# Patient Record
Sex: FEMALE | Race: WHITE | NOT HISPANIC OR LATINO | URBAN - METROPOLITAN AREA
[De-identification: names, ages, dates, MRNs, and addresses within clinical notes are randomized per-mention and may not be internally consistent; named-entity substitution may affect disease eponyms.]

---

## 2023-03-16 ENCOUNTER — OFFICE (OUTPATIENT)
Dept: URBAN - METROPOLITAN AREA CLINIC 67 | Facility: CLINIC | Age: 58
End: 2023-03-16

## 2023-03-16 VITALS
HEIGHT: 61 IN | SYSTOLIC BLOOD PRESSURE: 110 MMHG | HEART RATE: 92 BPM | WEIGHT: 148 LBS | DIASTOLIC BLOOD PRESSURE: 80 MMHG | OXYGEN SATURATION: 93 %

## 2023-03-16 DIAGNOSIS — K21.9 GASTRO-ESOPHAGEAL REFLUX DISEASE WITHOUT ESOPHAGITIS: ICD-10-CM

## 2023-03-16 DIAGNOSIS — R10.13 EPIGASTRIC PAIN: ICD-10-CM

## 2023-03-16 DIAGNOSIS — K58.9 IRRITABLE BOWEL SYNDROME WITHOUT DIARRHEA: ICD-10-CM

## 2023-03-16 DIAGNOSIS — R10.12 LEFT UPPER QUADRANT PAIN: ICD-10-CM

## 2023-03-16 DIAGNOSIS — R74.8 ABNORMAL LEVELS OF OTHER SERUM ENZYMES: ICD-10-CM

## 2023-03-16 PROCEDURE — 99204 OFFICE O/P NEW MOD 45 MIN: CPT | Performed by: NURSE PRACTITIONER

## 2023-03-16 NOTE — SERVICEHPINOTES
Kathleen Vasquez   is seen for an initial visit today with left sided abdominal pain and epigastric pain. Today she has a history of pressure in her LUQ area for years and related it to diet and IBS. She has a history of IBS and lactose intolerance. The pain started to increase a few months ago. The pain will start in the LUQ and acts like a band around her epigastric area and go around into her back. 
allyssa Auguste went to PCP and starting taking Pantoprazole with no improvement in symptoms. The pain increased significantly during the night on 3/10/23 and she went to the ER because she was also having trouble breathing. The did labs and a CT. allyssa Auguste had nausea with the severe pain, but it is better now. She reports a decreased appetite due to abdominal fullness. She will have random vomiting twice a month. allyssa Leyva current bowel pattern is constipation and diarrhea. The has been her pattern for most of her life with IBS. No blood or mucus in stool. Last Colonoscopy in 2020 with no polyps, repeat in 10 years. br
br No significant reflux, dysphagia, weight loss, fever, chills or fatigue. br
br Labs
br 3/10/23 CMP- Alk Phos 117, CBC- Plt 475br  br Imaging 
br 3/10/2023 CT Abd/Pelvis w/contrast- Cholelithiasis. multiple gallstones in gallbladder br
br

## 2023-03-16 NOTE — SERVICENOTES
- Continue Pantoprazole once a day 
- We will send order for HIDA scan to [facility]. Call them in 3-4 days if you have not heard from them 
- We will consider additional work up based on HIDA results

## 2023-06-12 ENCOUNTER — OFFICE (OUTPATIENT)
Dept: URBAN - METROPOLITAN AREA CLINIC 67 | Facility: CLINIC | Age: 58
End: 2023-06-12
Payer: COMMERCIAL

## 2023-06-12 VITALS — WEIGHT: 144 LBS | HEIGHT: 61 IN

## 2023-06-12 DIAGNOSIS — K60.2 ANAL FISSURE, UNSPECIFIED: ICD-10-CM

## 2023-06-12 DIAGNOSIS — K62.89 OTHER SPECIFIED DISEASES OF ANUS AND RECTUM: ICD-10-CM

## 2023-06-12 PROCEDURE — 99213 OFFICE O/P EST LOW 20 MIN: CPT | Performed by: INTERNAL MEDICINE

## 2023-06-12 NOTE — SERVICEHPINOTES
Kathleen Vasquez   is seen today for a follow-up visit.
br
br    She reports a long history of intermittent episodes of symptomatic hemorrhoids but recently she developed rectal pain and burning that has been worse than what she historically has had with her hemorrhoids.
br She has tried several OTC hemorrhoid medications without improvement and has only noted some improvement with soaking in a hot tub.
br She endorses associated pain with BMs but no GI tract bleeding symptoms, constipation, fever/chills or abdominal pain. Per her report, a colonoscopy done in 2020 ([facility]) was unremarkable but I do not have a copy of that report to review myself.

## 2023-06-12 NOTE — SERVICENOTES
I will start her on a course of Diltiazem ointment twice daily and have her return for follow-up in 4-6 weeks.

## 2023-08-14 ENCOUNTER — OFFICE (OUTPATIENT)
Dept: URBAN - METROPOLITAN AREA CLINIC 67 | Facility: CLINIC | Age: 58
End: 2023-08-14
Payer: COMMERCIAL

## 2023-08-14 VITALS
SYSTOLIC BLOOD PRESSURE: 138 MMHG | OXYGEN SATURATION: 100 % | HEIGHT: 61 IN | DIASTOLIC BLOOD PRESSURE: 72 MMHG | WEIGHT: 142 LBS | HEART RATE: 81 BPM

## 2023-08-14 DIAGNOSIS — K60.2 ANAL FISSURE, UNSPECIFIED: ICD-10-CM

## 2023-08-14 DIAGNOSIS — K62.89 OTHER SPECIFIED DISEASES OF ANUS AND RECTUM: ICD-10-CM

## 2023-08-14 PROCEDURE — 99214 OFFICE O/P EST MOD 30 MIN: CPT | Performed by: INTERNAL MEDICINE

## 2023-08-14 NOTE — SERVICEHPINOTES
Kathleen Vasquez   is seen today for a follow-up visit   regarding an anal fissure.At the time of her last appointment on 6/12/23, she related the recent onset of rectal pain/burning and discomfort with BMs.brShe was found to have a posterior fissure on exam and was placed on a course of topical diltiazem ointment. brPer her report, a colonoscopy done in 2020 ([facility] [place]) was unremarkable but I do not have a copy of that report to review myself - there is no known family history of CRC or polyps.Today, she reports that unfortunately her rectal pain/discomfort is only marginally better with treatment of the topical diltiazem ointment - she has also been using Sitz baths. Her discomfort is present to some degree at all times but definitely worse with BMs. Her BMs have been loose and she denies any GI tract bleeding symptoms.

## 2023-08-14 NOTE — SERVICENOTES
I will refer her to see Dr. 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

## 2025-02-21 ENCOUNTER — OFFICE (OUTPATIENT)
Dept: URBAN - METROPOLITAN AREA CLINIC 67 | Facility: CLINIC | Age: 60
End: 2025-02-21

## 2025-02-21 VITALS
HEART RATE: 64 BPM | SYSTOLIC BLOOD PRESSURE: 128 MMHG | OXYGEN SATURATION: 98 % | DIASTOLIC BLOOD PRESSURE: 78 MMHG | HEIGHT: 61 IN | WEIGHT: 140 LBS

## 2025-02-21 DIAGNOSIS — R15.2 FECAL URGENCY: ICD-10-CM

## 2025-02-21 DIAGNOSIS — K21.9 GASTRO-ESOPHAGEAL REFLUX DISEASE WITHOUT ESOPHAGITIS: ICD-10-CM

## 2025-02-21 DIAGNOSIS — R19.5 OTHER FECAL ABNORMALITIES: ICD-10-CM

## 2025-02-21 DIAGNOSIS — R10.12 LEFT UPPER QUADRANT PAIN: ICD-10-CM

## 2025-02-21 DIAGNOSIS — R14.0 ABDOMINAL DISTENSION (GASEOUS): ICD-10-CM

## 2025-02-21 PROCEDURE — 99214 OFFICE O/P EST MOD 30 MIN: CPT | Performed by: NURSE PRACTITIONER
